# Patient Record
Sex: FEMALE | Race: WHITE | NOT HISPANIC OR LATINO | Employment: FULL TIME | URBAN - NONMETROPOLITAN AREA
[De-identification: names, ages, dates, MRNs, and addresses within clinical notes are randomized per-mention and may not be internally consistent; named-entity substitution may affect disease eponyms.]

---

## 2023-03-01 LAB
6-ACETYLMORPHINE: <25 NG/ML
7-AMINOCLONAZEPAM: <25 NG/ML
ALPHA-HYDROXYALPRAZOLAM: <25 NG/ML
ALPHA-HYDROXYMIDAZOLAM: <25 NG/ML
ALPRAZOLAM: <25 NG/ML
AMPHETAMINE (PRESENCE) IN URINE BY SCREEN METHOD: ABNORMAL
BARBITURATES PRESENCE IN URINE BY SCREEN METHOD: ABNORMAL
CANNABINOIDS IN URINE BY SCREEN METHOD: ABNORMAL
CHLORDIAZEPOXIDE: <25 NG/ML
CLONAZEPAM: <25 NG/ML
COCAINE (PRESENCE) IN URINE BY SCREEN METHOD: ABNORMAL
CODEINE: <50 NG/ML
CREATINE, URINE FOR DRUG: 115.8 MG/DL
DIAZEPAM: <25 NG/ML
DRUG SCREEN COMMENT URINE: ABNORMAL
EDDP: <25 NG/ML
FENTANYL CONFIRMATION, URINE: <2.5 NG/ML
HYDROCODONE: <25 NG/ML
HYDROMORPHONE: <25 NG/ML
LORAZEPAM: <25 NG/ML
METHADONE CONFIRMATION,URINE: <25 NG/ML
MIDAZOLAM: <25 NG/ML
MORPHINE URINE: <50 NG/ML
NORDIAZEPAM: <25 NG/ML
NORFENTANYL: <2.5 NG/ML
NORHYDROCODONE: <25 NG/ML
NOROXYCODONE: <25 NG/ML
O-DESMETHYLTRAMADOL: <50 NG/ML
OXAZEPAM: <25 NG/ML
OXYCODONE: <25 NG/ML
OXYMORPHONE: <25 NG/ML
PHENCYCLIDINE (PRESENCE) IN URINE BY SCREEN METHOD: ABNORMAL
TEMAZEPAM: <25 NG/ML
TRAMADOL: <50 NG/ML
ZOLPIDEM METABOLITE (ZCA): <25 NG/ML
ZOLPIDEM: <25 NG/ML

## 2023-03-04 LAB
AMPHETAMINES,URINE: >5000 NG/ML
MDA,URINE: <200 NG/ML
MDEA,URINE: <200 NG/ML
MDMA,UR: <200 NG/ML
METHAMPHETAMINE QUANTITATIVE URINE: <200 NG/ML
PHENTERMINE,UR: <200 NG/ML

## 2023-03-30 DIAGNOSIS — F90.8 ATTENTION DEFICIT HYPERACTIVITY DISORDER (ADHD), OTHER TYPE: ICD-10-CM

## 2023-03-30 RX ORDER — NEBIVOLOL 2.5 MG/1
1 TABLET ORAL DAILY
COMMUNITY
Start: 2023-01-18 | End: 2023-05-08 | Stop reason: ALTCHOICE

## 2023-03-30 RX ORDER — FUROSEMIDE 80 MG/1
80 TABLET ORAL DAILY
COMMUNITY

## 2023-03-30 RX ORDER — LISDEXAMFETAMINE DIMESYLATE 70 MG/1
70 CAPSULE ORAL EVERY MORNING
Qty: 30 CAPSULE | Refills: 0 | Status: SHIPPED | OUTPATIENT
Start: 2023-03-30 | End: 2023-05-08 | Stop reason: SDUPTHER

## 2023-03-30 RX ORDER — SITAGLIPTIN 100 MG/1
100 TABLET, FILM COATED ORAL DAILY
COMMUNITY
End: 2023-05-08 | Stop reason: SDUPTHER

## 2023-03-30 RX ORDER — LISDEXAMFETAMINE DIMESYLATE 70 MG/1
70 CAPSULE ORAL EVERY MORNING
COMMUNITY
End: 2023-03-30 | Stop reason: SDUPTHER

## 2023-04-03 RX ORDER — ERGOCALCIFEROL 1.25 MG/1
1 CAPSULE ORAL
COMMUNITY
Start: 2023-01-14

## 2023-04-04 RX ORDER — ERGOCALCIFEROL 1.25 MG/1
1 CAPSULE ORAL
Qty: 12 CAPSULE | Refills: 0 | OUTPATIENT
Start: 2023-04-04

## 2023-04-06 RX ORDER — ERGOCALCIFEROL 1.25 MG/1
1 CAPSULE ORAL
Qty: 12 CAPSULE | Refills: 0 | OUTPATIENT
Start: 2023-04-06

## 2023-05-03 DIAGNOSIS — F90.8 ATTENTION DEFICIT HYPERACTIVITY DISORDER (ADHD), OTHER TYPE: ICD-10-CM

## 2023-05-03 RX ORDER — LISDEXAMFETAMINE DIMESYLATE 70 MG/1
70 CAPSULE ORAL EVERY MORNING
Qty: 30 CAPSULE | Refills: 0 | OUTPATIENT
Start: 2023-05-03

## 2023-05-08 ENCOUNTER — OFFICE VISIT (OUTPATIENT)
Dept: PRIMARY CARE | Facility: CLINIC | Age: 48
End: 2023-05-08
Payer: COMMERCIAL

## 2023-05-08 VITALS
BODY MASS INDEX: 38.65 KG/M2 | HEIGHT: 65 IN | OXYGEN SATURATION: 98 % | DIASTOLIC BLOOD PRESSURE: 79 MMHG | WEIGHT: 232 LBS | SYSTOLIC BLOOD PRESSURE: 146 MMHG | HEART RATE: 122 BPM

## 2023-05-08 DIAGNOSIS — Z13.9 SCREENING FOR CONDITION: ICD-10-CM

## 2023-05-08 DIAGNOSIS — R00.0 ELEVATED PULSE RATE: ICD-10-CM

## 2023-05-08 DIAGNOSIS — F90.2 ADHD (ATTENTION DEFICIT HYPERACTIVITY DISORDER), COMBINED TYPE: Primary | ICD-10-CM

## 2023-05-08 DIAGNOSIS — R53.83 OTHER FATIGUE: ICD-10-CM

## 2023-05-08 DIAGNOSIS — Z12.31 ENCOUNTER FOR SCREENING MAMMOGRAM FOR MALIGNANT NEOPLASM OF BREAST: ICD-10-CM

## 2023-05-08 DIAGNOSIS — E78.5 DYSLIPIDEMIA: ICD-10-CM

## 2023-05-08 DIAGNOSIS — F90.8 ATTENTION DEFICIT HYPERACTIVITY DISORDER (ADHD), OTHER TYPE: ICD-10-CM

## 2023-05-08 DIAGNOSIS — R22.1 NECK MASS: ICD-10-CM

## 2023-05-08 DIAGNOSIS — E55.9 VITAMIN D DEFICIENCY: ICD-10-CM

## 2023-05-08 DIAGNOSIS — G99.2 STENOSIS OF CERVICAL SPINE WITH MYELOPATHY (MULTI): ICD-10-CM

## 2023-05-08 DIAGNOSIS — R60.0 LOCALIZED EDEMA: ICD-10-CM

## 2023-05-08 DIAGNOSIS — E66.01 CLASS 2 SEVERE OBESITY DUE TO EXCESS CALORIES WITH SERIOUS COMORBIDITY AND BODY MASS INDEX (BMI) OF 38.0 TO 38.9 IN ADULT (MULTI): ICD-10-CM

## 2023-05-08 DIAGNOSIS — Z12.11 COLON CANCER SCREENING: ICD-10-CM

## 2023-05-08 DIAGNOSIS — M48.02 STENOSIS OF CERVICAL SPINE WITH MYELOPATHY (MULTI): ICD-10-CM

## 2023-05-08 DIAGNOSIS — E28.2 PCOS (POLYCYSTIC OVARIAN SYNDROME): ICD-10-CM

## 2023-05-08 DIAGNOSIS — R03.0 ELEVATED BP WITHOUT DIAGNOSIS OF HYPERTENSION: ICD-10-CM

## 2023-05-08 PROBLEM — M47.812 CERVICAL SPONDYLOARTHRITIS: Status: ACTIVE | Noted: 2023-05-08

## 2023-05-08 PROBLEM — K21.9 GERD (GASTROESOPHAGEAL REFLUX DISEASE): Status: ACTIVE | Noted: 2023-05-08

## 2023-05-08 PROBLEM — R79.89 LOW SERUM CORTISOL LEVEL: Status: ACTIVE | Noted: 2023-05-08

## 2023-05-08 PROBLEM — K90.0 CELIAC DISEASE (HHS-HCC): Status: ACTIVE | Noted: 2020-10-19

## 2023-05-08 PROBLEM — J04.0 ACUTE LARYNGITIS: Status: ACTIVE | Noted: 2018-12-06

## 2023-05-08 PROBLEM — M50.10 HERNIATION OF CERVICAL INTERVERTEBRAL DISC WITH RADICULOPATHY: Status: ACTIVE | Noted: 2023-05-08

## 2023-05-08 PROBLEM — E04.9 ENLARGED THYROID: Status: ACTIVE | Noted: 2020-10-19

## 2023-05-08 PROBLEM — H66.90 ACUTE OTITIS MEDIA: Status: ACTIVE | Noted: 2017-04-05

## 2023-05-08 PROBLEM — J01.01 ACUTE RECURRENT MAXILLARY SINUSITIS: Status: ACTIVE | Noted: 2023-05-08

## 2023-05-08 PROBLEM — M48.061 LUMBAR STENOSIS: Status: ACTIVE | Noted: 2023-05-08

## 2023-05-08 PROBLEM — M47.817 LUMBAR AND SACRAL SPONDYLOARTHRITIS: Status: ACTIVE | Noted: 2023-05-08

## 2023-05-08 PROBLEM — J30.9 ALLERGIC RHINITIS DUE TO ALLERGEN: Status: ACTIVE | Noted: 2023-05-08

## 2023-05-08 PROBLEM — M50.20 HERNIATED CERVICAL DISC WITHOUT MYELOPATHY: Status: ACTIVE | Noted: 2023-05-08

## 2023-05-08 PROBLEM — N92.6 IRREGULAR MENSES: Status: ACTIVE | Noted: 2020-10-19

## 2023-05-08 PROBLEM — M54.12 RIGHT CERVICAL RADICULOPATHY: Status: ACTIVE | Noted: 2023-05-08

## 2023-05-08 PROBLEM — R59.1 LYMPHADENOPATHY: Status: ACTIVE | Noted: 2017-04-05

## 2023-05-08 PROBLEM — G47.00 INSOMNIA: Status: ACTIVE | Noted: 2023-05-08

## 2023-05-08 PROBLEM — J30.89 CHRONIC NONSEASONAL ALLERGIC RHINITIS DUE TO POLLEN: Status: ACTIVE | Noted: 2017-10-20

## 2023-05-08 PROBLEM — J20.8 ACUTE BRONCHITIS DUE TO OTHER SPECIFIED ORGANISMS: Status: ACTIVE | Noted: 2023-05-08

## 2023-05-08 PROBLEM — M54.50 LOW BACK PAIN: Status: ACTIVE | Noted: 2023-05-08

## 2023-05-08 PROCEDURE — 99214 OFFICE O/P EST MOD 30 MIN: CPT | Performed by: FAMILY MEDICINE

## 2023-05-08 PROCEDURE — 1036F TOBACCO NON-USER: CPT | Performed by: FAMILY MEDICINE

## 2023-05-08 PROCEDURE — 3008F BODY MASS INDEX DOCD: CPT | Performed by: FAMILY MEDICINE

## 2023-05-08 RX ORDER — LISDEXAMFETAMINE DIMESYLATE 70 MG/1
70 CAPSULE ORAL EVERY MORNING
Qty: 30 CAPSULE | Refills: 0 | Status: SHIPPED | OUTPATIENT
Start: 2023-05-08 | End: 2023-06-06 | Stop reason: SDUPTHER

## 2023-05-08 RX ORDER — METOPROLOL TARTRATE 25 MG/1
TABLET, FILM COATED ORAL
Qty: 30 TABLET | Refills: 1 | Status: SHIPPED | OUTPATIENT
Start: 2023-05-08

## 2023-05-08 ASSESSMENT — PAIN SCALES - GENERAL: PAINLEVEL: 0-NO PAIN

## 2023-05-08 NOTE — PROGRESS NOTES
Subjective     Fatou Schilling is a 47 y.o. female who presents for Follow-up (3 month follow up meds).      HPI  The patient is a 47 year-old female presenting to the clinic for a 3 month follow up on medication.  Discussed elevated blood pressure.  Complete blood work ordered 5/8/2023.  I have educated the patient on fatigue. The patient denies restless leg syndrome and denies obstructive sleep apnea.  Discussed Edema.  Referral placed with Vascular Medicine 5/8/2023.  I have reviewed and validated OARRS. I advised the patient about medication abuse, addiction, and dependency. I have counseled the patient on ADHD management. Caution advised about the medication’s effect on pregnancy. Does not wish to go for urine pregnancy test.  Educated the patient on obesity, low-fat diet and exercise. Encouraged the patient to lose weight.   Order placed for Mammogram 5/8/2023.  Order placed for Colonoscopy 5/8/2023.  Complete blood work after fasting for 10 hours.  Follow up in office.        History of ADHD.  Medication is helping with the concentration focus.  Denies depression.  Denies suicidal.  Denies homicidal.  Had been to cardiologist and discussed about elevated pulse.  Educated on edema.  Denies PND but denies orthopnea.  Educated on obesity and diet and exercise but advised to lose weight.  Discussed screening blood work for screening for condition.  History of cervical spine stenosis.  Advised to keep appointment with the specialist.  Complaining feeling tired but advised on diet exercise.  Complaint lump on the neck and concerned about thyroid.  Educated on elevated blood pressure and low-salt diet exercise.  Keep appointment with the cardiologist.  Educated on elevated pulse rate and keep appoint with a cardiologist.  Denies chest pain.  Denies heart palpitations.  Denies shortness of breath.  Advised to call 911 if develops chest pain and/or heart palpitations and/or shortness of breath.  Educated on vitamin  "deficiency.  Educated on PCOS.          Review of Systems  Review of systems:    General:  Denies fever.  Denies chills.    HEENT: Denies nasal congestion.  Denies sinus pressure.    Respiratory:  Denies cough.  Denies shortness of breath.    Cardiovascular:  Dictated as above.    Gastrointestinal:  Denies nausea vomiting diarrhea.  Denies abdominal pain.    Genitourinary: Denies burning urination.  Denies frequent urination.  Denies flank pain.  Denies blood in the urine.  Denies abnormal vaginal discharge.    Neurology:  Dictated as above.      Musculoskeletal:  Denies body aches.  Denies joint pains.  Denies muscle aches.  Denies muscle weakness    Endocrinology:  Dictated as above.    Psychiatric:  Dictated as above.    Skin exam.  Dictated as above  Objective   /79 (BP Location: Right arm, Patient Position: Sitting, BP Cuff Size: Large adult)   Pulse (!) 122   Ht 1.651 m (5' 5\")   Wt 105 kg (232 lb)   SpO2 98%   BMI 38.61 kg/m²        Physical Exam  General.  Not in distress.  HEENT normocephalic anicteric sclerae.  Neck soft supple no thyromegaly.     Lungs are clear.  Heart regular.  Abdomen soft nontender nondistended bowel sounds are positive.  Extremities no clubbing cyanosis or edema.  Psychiatric.  Has good eye contact.  No crying spells noted.  Speech was normal.  Denies depression.  Denies suicidal.  Denies homicidal.  Skin exam.  Lump noted on the anterior neck.    Assessment/Plan   ADHD.  Counseled for ADHD.  Reviewed OARRS report and validated.  Caution advised about medication for abuse, addiction and dependency.  Does not wish to go see the counselor for counseling.  Does not wish to go see the psychiatrist.  I have reviewed urine drug screening.  Reviewed controlled medication contract.  2.  Edema.  Denies PND.  Denies orthopnea  3.  Obesity.  Educated on diet exercise but advised to lose weight.    4.  Screening for condition.  Dictated as above.    5.  Cervical spine stenosis.  " Dictated as above.    6.  Elevated pulse rate/elevated BP.  Dictated as above.    7.  Neck mass.  We will follow-up on the ultrasound.    8.  Fatigue.  Educated on diet and exercise.    9.  Dyslipidemia.  Educated on diet exercise we will continue monitor.    10.  Vitamin D deficiency.  Educated on vitamin D deficiency we will continue monitor.                Problem List Items Addressed This Visit          Nervous    Stenosis of cervical spine with myelopathy (CMS/HCC)       Circulatory    Elevated BP without diagnosis of hypertension    Relevant Orders    Albumin , Urine Random       Other    ADD (attention deficit disorder)    Relevant Medications    lisdexamfetamine (Vyvanse) 70 mg capsule    ADHD (attention deficit hyperactivity disorder), combined type - Primary    Dyslipidemia    Relevant Orders    Lipid panel    Edema    Relevant Orders    Referral to Vascular Medicine    Fatigue    Relevant Orders    CBC and Auto Differential    Comprehensive metabolic panel    Urinalysis with Reflex Microscopic    Tsh With Reflex To Free T4 If Abnormal    Vitamin B12    Folate     Other Visit Diagnoses       Class 2 severe obesity due to excess calories with serious comorbidity and body mass index (BMI) of 38.0 to 38.9 in adult (CMS/HCC)        Encounter for screening mammogram for malignant neoplasm of breast        Relevant Orders    BI mammo bilateral screening tomosynthesis    Screening for condition        Relevant Orders    HIV 1/2 antibodies, rapid    Hepatitis C antibody    Colon cancer screening        Relevant Orders    Colonoscopy    Elevated pulse rate        Relevant Medications    metoprolol tartrate (Lopressor) 25 mg tablet    Neck mass        Relevant Orders    US head neck soft tissue    PCOS (polycystic ovarian syndrome)        Relevant Medications    SITagliptin phosphate (Januvia) 100 mg tablet    Other Relevant Orders    Hemoglobin A1C    Insulin, random    Vitamin D deficiency        Relevant Orders     Vitamin D 25-Hydroxy,Total            Scribe Attestation  By signing my name below, I, Aquiles Snow   attest that this documentation has been prepared under the direction and in the presence of Lele Robb MD.

## 2023-06-06 DIAGNOSIS — F90.8 ATTENTION DEFICIT HYPERACTIVITY DISORDER (ADHD), OTHER TYPE: ICD-10-CM

## 2023-06-06 RX ORDER — LISDEXAMFETAMINE DIMESYLATE 70 MG/1
70 CAPSULE ORAL EVERY MORNING
Qty: 30 CAPSULE | Refills: 0 | Status: SHIPPED | OUTPATIENT
Start: 2023-06-06 | End: 2023-07-28 | Stop reason: SDUPTHER

## 2023-07-28 ENCOUNTER — OFFICE VISIT (OUTPATIENT)
Dept: PRIMARY CARE | Facility: CLINIC | Age: 48
End: 2023-07-28
Payer: COMMERCIAL

## 2023-07-28 VITALS
DIASTOLIC BLOOD PRESSURE: 98 MMHG | HEART RATE: 103 BPM | BODY MASS INDEX: 41.15 KG/M2 | HEIGHT: 64 IN | SYSTOLIC BLOOD PRESSURE: 140 MMHG | TEMPERATURE: 96.2 F | WEIGHT: 241 LBS | OXYGEN SATURATION: 97 %

## 2023-07-28 DIAGNOSIS — I89.0 LYMPHEDEMA: Primary | ICD-10-CM

## 2023-07-28 DIAGNOSIS — Z79.899 MEDICATION MANAGEMENT: ICD-10-CM

## 2023-07-28 DIAGNOSIS — F90.8 ATTENTION DEFICIT HYPERACTIVITY DISORDER (ADHD), OTHER TYPE: ICD-10-CM

## 2023-07-28 DIAGNOSIS — E28.2 PCOS (POLYCYSTIC OVARIAN SYNDROME): ICD-10-CM

## 2023-07-28 DIAGNOSIS — R35.0 FREQUENCY OF URINATION: ICD-10-CM

## 2023-07-28 LAB
POC APPEARANCE, URINE: ABNORMAL
POC BILIRUBIN, URINE: NEGATIVE
POC BLOOD, URINE: NEGATIVE
POC COLOR, URINE: YELLOW
POC GLUCOSE, URINE: NEGATIVE MG/DL
POC KETONES, URINE: NEGATIVE MG/DL
POC LEUKOCYTES, URINE: NEGATIVE
POC NITRITE,URINE: NEGATIVE
POC PH, URINE: 6 PH
POC PROTEIN, URINE: ABNORMAL MG/DL
POC SPECIFIC GRAVITY, URINE: >=1.03
POC UROBILINOGEN, URINE: 0.2 EU/DL

## 2023-07-28 PROCEDURE — 87205 SMEAR GRAM STAIN: CPT

## 2023-07-28 PROCEDURE — 99203 OFFICE O/P NEW LOW 30 MIN: CPT

## 2023-07-28 PROCEDURE — 1036F TOBACCO NON-USER: CPT

## 2023-07-28 PROCEDURE — 80324 DRUG SCREEN AMPHETAMINES 1/2: CPT

## 2023-07-28 PROCEDURE — 3008F BODY MASS INDEX DOCD: CPT

## 2023-07-28 PROCEDURE — 81003 URINALYSIS AUTO W/O SCOPE: CPT

## 2023-07-28 PROCEDURE — 80307 DRUG TEST PRSMV CHEM ANLYZR: CPT

## 2023-07-28 PROCEDURE — 87086 URINE CULTURE/COLONY COUNT: CPT

## 2023-07-28 RX ORDER — LISDEXAMFETAMINE DIMESYLATE 70 MG/1
70 CAPSULE ORAL EVERY MORNING
Qty: 30 CAPSULE | Refills: 0 | Status: SHIPPED | OUTPATIENT
Start: 2023-07-28 | End: 2023-07-31 | Stop reason: SDUPTHER

## 2023-07-28 ASSESSMENT — PATIENT HEALTH QUESTIONNAIRE - PHQ9
SUM OF ALL RESPONSES TO PHQ9 QUESTIONS 1 AND 2: 0
2. FEELING DOWN, DEPRESSED OR HOPELESS: NOT AT ALL
1. LITTLE INTEREST OR PLEASURE IN DOING THINGS: NOT AT ALL

## 2023-07-28 ASSESSMENT — ENCOUNTER SYMPTOMS
LOSS OF SENSATION IN FEET: 0
DEPRESSION: 0
OCCASIONAL FEELINGS OF UNSTEADINESS: 0

## 2023-07-28 NOTE — PATIENT INSTRUCTIONS
Labs next week in [facility] panel and UA, UDS today  See in 90 days    Thank you for coming in today, if any questions or concerns arise, please call my office.   Mikael Davies, RAHAT-CNP

## 2023-07-28 NOTE — PROGRESS NOTES
Subjective   Patient ID: Fatou Schilling is a 47 y.o. female who presents for Establish Care (Fatou is here to establish care. Has been out of medications since the 10th. However she did find a tablet in her purse and was able to take that. ).  Patient presents to establish care today, UDS and contract to sign today.   Seeing Endocrine for Thyroid Goiter, we will obtain TSH today    Chronic back pain  PCOS - Managed with Januvia.             Vitals:    07/28/23 1007   BP: (!) 140/98   Pulse: 103   Temp: 35.7 °C (96.2 °F)   SpO2: 97%       Review of Systems    Objective   Physical Exam    Assessment/Plan   Problem List Items Addressed This Visit       ADD (attention deficit disorder)    Relevant Medications    lisdexamfetamine (Vyvanse) 70 mg capsule     Other Visit Diagnoses       Lymphedema    -  Primary    PCOS (polycystic ovarian syndrome)        Relevant Medications    SITagliptin phosphate (Januvia) 100 mg tablet    lisdexamfetamine (Vyvanse) 70 mg capsule    Other Relevant Orders    Vitamin D 1,25 Dihydroxy    Vitamin B12    Folate    TSH with reflex to Free T4 if abnormal    Hemoglobin A1C    Comprehensive Metabolic Panel    CBC    Insulin, random    Testosterone, total and free    Cortisol    Frequency of urination        Relevant Orders    POCT UA Automated manually resulted (Completed)    Gram Stain for Bacterial Vaginosis/Yeast    Urine Culture    Medication management        Relevant Orders    Drug Screen, Urine With Reflex to Confirmation                 Thank you for coming in today, please call my office if you have any concerns or questions.     Mikael GIANG, CNP

## 2023-07-29 LAB
AMPHETAMINE (PRESENCE) IN URINE BY SCREEN METHOD: ABNORMAL
BARBITURATES PRESENCE IN URINE BY SCREEN METHOD: ABNORMAL
BENZODIAZEPINE (PRESENCE) IN URINE BY SCREEN METHOD: ABNORMAL
CANNABINOIDS IN URINE BY SCREEN METHOD: ABNORMAL
CLUE CELLS: PRESENT
COCAINE (PRESENCE) IN URINE BY SCREEN METHOD: ABNORMAL
DRUG SCREEN COMMENT URINE: ABNORMAL
FENTANYL URINE: ABNORMAL
METHADONE (PRESENCE) IN URINE BY SCREEN METHOD: ABNORMAL
NUGENT SCORE: 8
OPIATES (PRESENCE) IN URINE BY SCREEN METHOD: ABNORMAL
OXYCODONE (PRESENCE) IN URINE BY SCREEN METHOD: ABNORMAL
PHENCYCLIDINE (PRESENCE) IN URINE BY SCREEN METHOD: ABNORMAL
YEAST: ABNORMAL

## 2023-07-30 LAB — URINE CULTURE: NORMAL

## 2023-07-31 DIAGNOSIS — F90.8 ATTENTION DEFICIT HYPERACTIVITY DISORDER (ADHD), OTHER TYPE: ICD-10-CM

## 2023-07-31 DIAGNOSIS — N76.0 BACTERIAL VAGINOSIS: Primary | ICD-10-CM

## 2023-07-31 DIAGNOSIS — E28.2 PCOS (POLYCYSTIC OVARIAN SYNDROME): ICD-10-CM

## 2023-07-31 DIAGNOSIS — B96.89 BACTERIAL VAGINOSIS: Primary | ICD-10-CM

## 2023-07-31 RX ORDER — METRONIDAZOLE 500 MG/1
500 TABLET ORAL 2 TIMES DAILY
Qty: 14 TABLET | Refills: 0 | Status: SHIPPED | OUTPATIENT
Start: 2023-07-31 | End: 2023-08-07

## 2023-07-31 RX ORDER — LISDEXAMFETAMINE DIMESYLATE 70 MG/1
70 CAPSULE ORAL EVERY MORNING
Qty: 30 CAPSULE | Refills: 0 | Status: SHIPPED | OUTPATIENT
Start: 2023-07-31 | End: 2023-09-05 | Stop reason: SDUPTHER

## 2023-07-31 NOTE — TELEPHONE ENCOUNTER
----- Message from RAHAT Liang-CNP sent at 7/31/2023  8:05 AM EDT -----  Positive for BV, start Metronidazole BID for 7 Days. Please notify. Sent to [facility]

## 2023-07-31 NOTE — TELEPHONE ENCOUNTER
Spoke with Fatou. Verbalized understanding. She is needing the vyvanse sent to [facility] on [facility]. All other pharmacies are out of that dose.

## 2023-08-07 LAB
AMPHETAMINES,URINE: <50 NG/ML
MDA,URINE: <200 NG/ML
MDEA,URINE: <200 NG/ML
MDMA,UR: <200 NG/ML
METHAMPHETAMINE QUANTITATIVE URINE: <200 NG/ML
PHENTERMINE,UR: <200 NG/ML

## 2023-08-08 ENCOUNTER — APPOINTMENT (OUTPATIENT)
Dept: PRIMARY CARE | Facility: CLINIC | Age: 48
End: 2023-08-08
Payer: COMMERCIAL

## 2023-09-05 ENCOUNTER — TELEPHONE (OUTPATIENT)
Dept: PRIMARY CARE | Facility: CLINIC | Age: 48
End: 2023-09-05
Payer: COMMERCIAL

## 2023-09-05 DIAGNOSIS — E28.2 PCOS (POLYCYSTIC OVARIAN SYNDROME): ICD-10-CM

## 2023-09-05 DIAGNOSIS — F90.8 ATTENTION DEFICIT HYPERACTIVITY DISORDER (ADHD), OTHER TYPE: ICD-10-CM

## 2023-09-05 RX ORDER — LISDEXAMFETAMINE DIMESYLATE 60 MG/1
60 CAPSULE ORAL EVERY MORNING
Qty: 30 CAPSULE | Refills: 0 | Status: SHIPPED | OUTPATIENT
Start: 2023-09-05 | End: 2023-10-05 | Stop reason: SDUPTHER

## 2023-09-05 NOTE — TELEPHONE ENCOUNTER
Fatou called stating that the Vyvanse 70mg is currently out of stock. Is willing to try the 60mg.

## 2023-10-05 DIAGNOSIS — E28.2 PCOS (POLYCYSTIC OVARIAN SYNDROME): ICD-10-CM

## 2023-10-05 DIAGNOSIS — F90.8 ATTENTION DEFICIT HYPERACTIVITY DISORDER (ADHD), OTHER TYPE: ICD-10-CM

## 2023-10-06 ENCOUNTER — TELEPHONE (OUTPATIENT)
Dept: PRIMARY CARE | Facility: CLINIC | Age: 48
End: 2023-10-06
Payer: COMMERCIAL

## 2023-10-06 DIAGNOSIS — E28.2 PCOS (POLYCYSTIC OVARIAN SYNDROME): ICD-10-CM

## 2023-10-06 DIAGNOSIS — F90.8 ATTENTION DEFICIT HYPERACTIVITY DISORDER (ADHD), OTHER TYPE: ICD-10-CM

## 2023-10-06 RX ORDER — LISDEXAMFETAMINE DIMESYLATE 60 MG/1
60 CAPSULE ORAL EVERY MORNING
Qty: 30 CAPSULE | Refills: 0 | Status: SHIPPED | OUTPATIENT
Start: 2023-10-06 | End: 2023-10-06 | Stop reason: SDUPTHER

## 2023-10-06 RX ORDER — LISDEXAMFETAMINE DIMESYLATE 70 MG/1
70 CAPSULE ORAL EVERY MORNING
Qty: 30 CAPSULE | Refills: 0 | Status: SHIPPED | OUTPATIENT
Start: 2023-10-06 | End: 2023-10-09 | Stop reason: SDUPTHER

## 2023-10-06 NOTE — TELEPHONE ENCOUNTER
Fatou called stating that [facility] in [place] has the 70mg vyvanse. [facility] does not have any available.

## 2023-10-09 DIAGNOSIS — F90.8 ATTENTION DEFICIT HYPERACTIVITY DISORDER (ADHD), OTHER TYPE: ICD-10-CM

## 2023-10-09 DIAGNOSIS — E28.2 PCOS (POLYCYSTIC OVARIAN SYNDROME): ICD-10-CM

## 2023-10-09 RX ORDER — LISDEXAMFETAMINE DIMESYLATE 70 MG/1
70 CAPSULE ORAL EVERY MORNING
Qty: 30 CAPSULE | Refills: 0 | Status: SHIPPED | OUTPATIENT
Start: 2023-10-09 | End: 2023-10-31 | Stop reason: SDUPTHER

## 2023-10-09 NOTE — TELEPHONE ENCOUNTER
Patient called and left a  Sunday stating that [facility] does not have her vyvanse but now [facility] does.     Asking this be sent to [facility]

## 2023-10-27 ENCOUNTER — APPOINTMENT (OUTPATIENT)
Dept: PRIMARY CARE | Facility: CLINIC | Age: 48
End: 2023-10-27
Payer: COMMERCIAL

## 2023-10-30 ENCOUNTER — LAB (OUTPATIENT)
Dept: LAB | Facility: LAB | Age: 48
End: 2023-10-30
Payer: COMMERCIAL

## 2023-10-30 DIAGNOSIS — E28.2 PCOS (POLYCYSTIC OVARIAN SYNDROME): ICD-10-CM

## 2023-10-30 LAB
ALBUMIN SERPL BCP-MCNC: 4.3 G/DL (ref 3.4–5)
ALP SERPL-CCNC: 66 U/L (ref 33–110)
ALT SERPL W P-5'-P-CCNC: 17 U/L (ref 7–45)
ANION GAP SERPL CALC-SCNC: 12 MMOL/L (ref 10–20)
AST SERPL W P-5'-P-CCNC: 14 U/L (ref 9–39)
BILIRUB SERPL-MCNC: 0.3 MG/DL (ref 0–1.2)
BUN SERPL-MCNC: 15 MG/DL (ref 6–23)
CALCIUM SERPL-MCNC: 9.3 MG/DL (ref 8.6–10.3)
CHLORIDE SERPL-SCNC: 106 MMOL/L (ref 98–107)
CO2 SERPL-SCNC: 24 MMOL/L (ref 21–32)
CREAT SERPL-MCNC: 0.77 MG/DL (ref 0.5–1.05)
ERYTHROCYTE [DISTWIDTH] IN BLOOD BY AUTOMATED COUNT: 12 % (ref 11.5–14.5)
GFR SERPL CREATININE-BSD FRML MDRD: >90 ML/MIN/1.73M*2
GLUCOSE SERPL-MCNC: 97 MG/DL (ref 74–99)
HCT VFR BLD AUTO: 45.3 % (ref 36–46)
HGB BLD-MCNC: 14.5 G/DL (ref 12–16)
MCH RBC QN AUTO: 29.8 PG (ref 26–34)
MCHC RBC AUTO-ENTMCNC: 32 G/DL (ref 32–36)
MCV RBC AUTO: 93 FL (ref 80–100)
NRBC BLD-RTO: 0 /100 WBCS (ref 0–0)
PLATELET # BLD AUTO: 234 X10*3/UL (ref 150–450)
PMV BLD AUTO: 11.3 FL (ref 7.5–11.5)
POTASSIUM SERPL-SCNC: 4 MMOL/L (ref 3.5–5.3)
PROT SERPL-MCNC: 6.8 G/DL (ref 6.4–8.2)
RBC # BLD AUTO: 4.86 X10*6/UL (ref 4–5.2)
SODIUM SERPL-SCNC: 138 MMOL/L (ref 136–145)
TSH SERPL-ACNC: 3.7 MIU/L (ref 0.44–3.98)
WBC # BLD AUTO: 9.8 X10*3/UL (ref 4.4–11.3)

## 2023-10-30 PROCEDURE — 83036 HEMOGLOBIN GLYCOSYLATED A1C: CPT

## 2023-10-30 PROCEDURE — 82652 VIT D 1 25-DIHYDROXY: CPT

## 2023-10-30 PROCEDURE — 84402 ASSAY OF FREE TESTOSTERONE: CPT

## 2023-10-30 PROCEDURE — 82533 TOTAL CORTISOL: CPT

## 2023-10-30 PROCEDURE — 36415 COLL VENOUS BLD VENIPUNCTURE: CPT

## 2023-10-30 PROCEDURE — 80053 COMPREHEN METABOLIC PANEL: CPT

## 2023-10-30 PROCEDURE — 82746 ASSAY OF FOLIC ACID SERUM: CPT

## 2023-10-30 PROCEDURE — 84443 ASSAY THYROID STIM HORMONE: CPT

## 2023-10-30 PROCEDURE — 82607 VITAMIN B-12: CPT

## 2023-10-30 PROCEDURE — 85027 COMPLETE CBC AUTOMATED: CPT

## 2023-10-30 PROCEDURE — 83525 ASSAY OF INSULIN: CPT

## 2023-10-31 ENCOUNTER — OFFICE VISIT (OUTPATIENT)
Dept: PRIMARY CARE | Facility: CLINIC | Age: 48
End: 2023-10-31
Payer: COMMERCIAL

## 2023-10-31 VITALS
BODY MASS INDEX: 40.51 KG/M2 | WEIGHT: 236 LBS | OXYGEN SATURATION: 98 % | TEMPERATURE: 96.1 F | SYSTOLIC BLOOD PRESSURE: 140 MMHG | DIASTOLIC BLOOD PRESSURE: 90 MMHG | HEART RATE: 123 BPM

## 2023-10-31 DIAGNOSIS — F90.8 ATTENTION DEFICIT HYPERACTIVITY DISORDER (ADHD), OTHER TYPE: ICD-10-CM

## 2023-10-31 DIAGNOSIS — E28.2 PCOS (POLYCYSTIC OVARIAN SYNDROME): ICD-10-CM

## 2023-10-31 DIAGNOSIS — L68.0 HIRSUTISM: Primary | ICD-10-CM

## 2023-10-31 LAB
CORTIS SERPL-MCNC: 20.7 UG/DL (ref 2.5–20)
EST. AVERAGE GLUCOSE BLD GHB EST-MCNC: 114 MG/DL
FOLATE SERPL-MCNC: 8.6 NG/ML
HBA1C MFR BLD: 5.6 %
INSULIN SERPL-ACNC: 10 UIU/ML (ref 3–25)
VIT B12 SERPL-MCNC: 332 PG/ML (ref 211–911)

## 2023-10-31 PROCEDURE — 1036F TOBACCO NON-USER: CPT

## 2023-10-31 PROCEDURE — 3008F BODY MASS INDEX DOCD: CPT

## 2023-10-31 PROCEDURE — 99214 OFFICE O/P EST MOD 30 MIN: CPT

## 2023-10-31 RX ORDER — GABAPENTIN 300 MG/1
CAPSULE ORAL
COMMUNITY
Start: 2021-06-08

## 2023-10-31 RX ORDER — FAMOTIDINE 40 MG/1
1 TABLET, FILM COATED ORAL DAILY
COMMUNITY

## 2023-10-31 RX ORDER — FINASTERIDE 5 MG/1
1 TABLET, FILM COATED ORAL DAILY
COMMUNITY
Start: 2022-10-04

## 2023-10-31 RX ORDER — GABAPENTIN 600 MG/1
TABLET ORAL
COMMUNITY

## 2023-10-31 RX ORDER — CYCLOBENZAPRINE HCL 10 MG
TABLET ORAL 3 TIMES DAILY PRN
COMMUNITY
Start: 2020-09-01

## 2023-10-31 RX ORDER — EMOLLIENT COMBINATION NO.53
CREAM (GRAM) TOPICAL
COMMUNITY
Start: 2021-05-19

## 2023-10-31 RX ORDER — AMMONIUM LACTATE 12 G/100G
CREAM TOPICAL AS NEEDED
COMMUNITY
Start: 2021-05-19

## 2023-10-31 RX ORDER — LISDEXAMFETAMINE DIMESYLATE 70 MG/1
70 CAPSULE ORAL EVERY MORNING
Qty: 30 CAPSULE | Refills: 0 | Status: SHIPPED | OUTPATIENT
Start: 2023-10-31 | End: 2023-12-07

## 2023-10-31 NOTE — PROGRESS NOTES
Subjective   Patient ID: Fatou Schilling is a 47 y.o. female who presents for Follow-up (90 day controlled follow up for vyvanse/Discuss PCOS) and Results (Had labs completed).  Fatou presents along with her patient for controlled follow up and lab discussion    Stimulants:   What is the patient's goal of therapy? concentration  Is this being achieved with current treatment? yes    Activities of Daily Living:   Is your overall impression that this patient is benefiting (symptom reduction outweighs side effects) from stimulant therapy? Yes     1. Physical Functioning: Same  2. Family Relationship: Same  3. Social Relationship: Same  4. Mood: Same  5. Sleep Patterns: Same  6. Overall Function: Better    Does demonstrate cushing's presentation, buffalo hump deformity, facial hair  Truncal obesity  Cortisol is elevated, up from 2 years prior.   Did recommend she see new endocrinologist for further management, she has seen multiple specialists for this issue in the past.     PCOS - taking Januvia, could not tolerate Metformin, per a endocrine provider, this provides some benefit                Vitals:    10/31/23 1328   BP: 140/90   Pulse: (!) 123   Temp: 35.6 °C (96.1 °F)   SpO2: 98%       Review of Systems    Objective   Physical Exam    Assessment/Plan   Problem List Items Addressed This Visit       ADD (attention deficit disorder)    Relevant Medications    lisdexamfetamine (Vyvanse) 70 mg capsule     Other Visit Diagnoses       Hirsutism    -  Primary    Relevant Orders    Referral to Endocrinology    PCOS (polycystic ovarian syndrome)        Relevant Medications    lisdexamfetamine (Vyvanse) 70 mg capsule    Other Relevant Orders    Estrogens, Total                 Thank you for coming in today, please call my office if you have any concerns or questions.     Mikael GIANG, CNP

## 2023-11-02 LAB — 1,25(OH)2D3 SERPL-MCNC: 53.2 PG/ML (ref 19.9–79.3)

## 2023-11-04 LAB
TESTOSTERONE FREE (CHAN): 4.3 PG/ML (ref 0.1–6.4)
TESTOSTERONE,TOTAL,LC-MS/MS: 24 NG/DL (ref 2–45)

## 2023-11-06 ENCOUNTER — LAB (OUTPATIENT)
Dept: LAB | Facility: LAB | Age: 48
End: 2023-11-06
Payer: COMMERCIAL

## 2023-11-06 DIAGNOSIS — E28.2 PCOS (POLYCYSTIC OVARIAN SYNDROME): ICD-10-CM

## 2023-11-06 PROCEDURE — 36415 COLL VENOUS BLD VENIPUNCTURE: CPT

## 2023-11-06 PROCEDURE — 82672 ASSAY OF ESTROGEN: CPT

## 2023-11-10 LAB — ESTROGEN SERPL-MCNC: 191 PG/ML

## 2023-12-06 DIAGNOSIS — F90.8 ATTENTION DEFICIT HYPERACTIVITY DISORDER (ADHD), OTHER TYPE: ICD-10-CM

## 2023-12-06 DIAGNOSIS — E28.2 PCOS (POLYCYSTIC OVARIAN SYNDROME): ICD-10-CM

## 2023-12-06 RX ORDER — LISDEXAMFETAMINE DIMESYLATE 70 MG/1
70 CAPSULE ORAL EVERY MORNING
Qty: 30 CAPSULE | Refills: 0 | Status: CANCELLED | OUTPATIENT
Start: 2023-12-06 | End: 2024-01-05

## 2023-12-07 DIAGNOSIS — F90.8 ATTENTION DEFICIT HYPERACTIVITY DISORDER (ADHD), OTHER TYPE: ICD-10-CM

## 2023-12-07 DIAGNOSIS — E28.2 PCOS (POLYCYSTIC OVARIAN SYNDROME): ICD-10-CM

## 2023-12-07 RX ORDER — LISDEXAMFETAMINE DIMESYLATE 70 MG/1
70 CAPSULE ORAL
Qty: 30 CAPSULE | Refills: 0 | Status: SHIPPED | OUTPATIENT
Start: 2023-12-07 | End: 2024-01-04 | Stop reason: SDUPTHER

## 2024-01-04 DIAGNOSIS — E28.2 PCOS (POLYCYSTIC OVARIAN SYNDROME): ICD-10-CM

## 2024-01-04 DIAGNOSIS — F90.8 ATTENTION DEFICIT HYPERACTIVITY DISORDER (ADHD), OTHER TYPE: ICD-10-CM

## 2024-01-04 RX ORDER — LISDEXAMFETAMINE DIMESYLATE 70 MG/1
70 CAPSULE ORAL
Qty: 30 CAPSULE | Refills: 0 | Status: SHIPPED | OUTPATIENT
Start: 2024-01-04 | End: 2024-01-26 | Stop reason: SDUPTHER

## 2024-01-26 ENCOUNTER — OFFICE VISIT (OUTPATIENT)
Dept: PRIMARY CARE | Facility: CLINIC | Age: 49
End: 2024-01-26
Payer: COMMERCIAL

## 2024-01-26 VITALS
DIASTOLIC BLOOD PRESSURE: 80 MMHG | SYSTOLIC BLOOD PRESSURE: 140 MMHG | HEART RATE: 81 BPM | OXYGEN SATURATION: 93 % | TEMPERATURE: 97.6 F

## 2024-01-26 DIAGNOSIS — E28.2 PCOS (POLYCYSTIC OVARIAN SYNDROME): ICD-10-CM

## 2024-01-26 DIAGNOSIS — F90.2 ADHD (ATTENTION DEFICIT HYPERACTIVITY DISORDER), COMBINED TYPE: Primary | ICD-10-CM

## 2024-01-26 DIAGNOSIS — F90.8 ATTENTION DEFICIT HYPERACTIVITY DISORDER (ADHD), OTHER TYPE: ICD-10-CM

## 2024-01-26 PROCEDURE — 1036F TOBACCO NON-USER: CPT

## 2024-01-26 PROCEDURE — 99214 OFFICE O/P EST MOD 30 MIN: CPT

## 2024-01-26 PROCEDURE — 3008F BODY MASS INDEX DOCD: CPT

## 2024-01-26 RX ORDER — LISDEXAMFETAMINE DIMESYLATE 70 MG/1
70 CAPSULE ORAL
Qty: 30 CAPSULE | Refills: 0 | Status: SHIPPED | OUTPATIENT
Start: 2024-01-26 | End: 2024-03-01 | Stop reason: SDUPTHER

## 2024-01-26 NOTE — PROGRESS NOTES
Subjective   Patient ID: Fatou Schilling is a 48 y.o. female who presents for Follow-up (Fatou is here for her controlled visit. ).  Stimulants:   What is the patient's goal of therapy? ADHD treatment, Vyvanse  Is this being achieved with current treatment? yes    Activities of Daily Living:   Is your overall impression that this patient is benefiting (symptom reduction outweighs side effects) from stimulant therapy? Yes     1. Physical Functioning: Better  2. Family Relationship: Better  3. Social Relationship: Better  4. Mood: Same  5. Sleep Patterns: Better  6. Overall Function: Better                Vitals:    01/26/24 1408   BP: 140/80   Pulse: 81   Temp: 36.4 °C (97.6 °F)   SpO2: 93%       Review of Systems    Objective   Physical Exam  Vitals and nursing note reviewed.   Constitutional:       Appearance: Normal appearance. She is not ill-appearing.   Neurological:      Mental Status: She is alert.         Assessment/Plan   Problem List Items Addressed This Visit       ADD (attention deficit disorder)    Relevant Medications    lisdexamfetamine (Vyvanse) 70 mg capsule    ADHD (attention deficit hyperactivity disorder), combined type - Primary     Other Visit Diagnoses       PCOS (polycystic ovarian syndrome)        Relevant Medications    lisdexamfetamine (Vyvanse) 70 mg capsule                 Thank you for coming in today, please call my office if you have any concerns or questions.     Mikael GIANG, CNP

## 2024-01-26 NOTE — PATIENT INSTRUCTIONS
Continue stimulant therapy   See in 3 months for follow up    Thank you for coming in today, if any questions or concerns arise, please call my office.   Mikael Davies, RAHAT-CNP

## 2024-03-01 DIAGNOSIS — F90.8 ATTENTION DEFICIT HYPERACTIVITY DISORDER (ADHD), OTHER TYPE: ICD-10-CM

## 2024-03-01 DIAGNOSIS — E28.2 PCOS (POLYCYSTIC OVARIAN SYNDROME): ICD-10-CM

## 2024-03-01 RX ORDER — LISDEXAMFETAMINE DIMESYLATE 70 MG/1
70 CAPSULE ORAL
Qty: 30 CAPSULE | Refills: 0 | Status: SHIPPED | OUTPATIENT
Start: 2024-03-01 | End: 2024-03-05 | Stop reason: SDUPTHER

## 2024-03-05 DIAGNOSIS — F90.8 ATTENTION DEFICIT HYPERACTIVITY DISORDER (ADHD), OTHER TYPE: ICD-10-CM

## 2024-03-05 DIAGNOSIS — E28.2 PCOS (POLYCYSTIC OVARIAN SYNDROME): ICD-10-CM

## 2024-03-05 RX ORDER — LISDEXAMFETAMINE DIMESYLATE 70 MG/1
70 CAPSULE ORAL
Qty: 30 CAPSULE | Refills: 0 | Status: SHIPPED | OUTPATIENT
Start: 2024-03-05 | End: 2024-04-04 | Stop reason: SDUPTHER

## 2024-03-13 DIAGNOSIS — E28.2 PCOS (POLYCYSTIC OVARIAN SYNDROME): ICD-10-CM

## 2024-03-13 RX ORDER — SITAGLIPTIN 100 MG/1
100 TABLET, FILM COATED ORAL DAILY
Qty: 30 TABLET | Refills: 2 | Status: SHIPPED | OUTPATIENT
Start: 2024-03-13

## 2024-04-04 DIAGNOSIS — E28.2 PCOS (POLYCYSTIC OVARIAN SYNDROME): ICD-10-CM

## 2024-04-04 DIAGNOSIS — F90.8 ATTENTION DEFICIT HYPERACTIVITY DISORDER (ADHD), OTHER TYPE: ICD-10-CM

## 2024-04-04 RX ORDER — LISDEXAMFETAMINE DIMESYLATE 70 MG/1
70 CAPSULE ORAL
Qty: 30 CAPSULE | Refills: 0 | Status: SHIPPED | OUTPATIENT
Start: 2024-04-04 | End: 2024-04-26 | Stop reason: SDUPTHER

## 2024-04-26 ENCOUNTER — OFFICE VISIT (OUTPATIENT)
Dept: PRIMARY CARE | Facility: CLINIC | Age: 49
End: 2024-04-26
Payer: COMMERCIAL

## 2024-04-26 VITALS
BODY MASS INDEX: 42.91 KG/M2 | OXYGEN SATURATION: 97 % | TEMPERATURE: 98 F | WEIGHT: 250 LBS | SYSTOLIC BLOOD PRESSURE: 140 MMHG | DIASTOLIC BLOOD PRESSURE: 80 MMHG | HEART RATE: 134 BPM

## 2024-04-26 DIAGNOSIS — F90.8 ATTENTION DEFICIT HYPERACTIVITY DISORDER (ADHD), OTHER TYPE: ICD-10-CM

## 2024-04-26 DIAGNOSIS — E28.2 PCOS (POLYCYSTIC OVARIAN SYNDROME): ICD-10-CM

## 2024-04-26 DIAGNOSIS — J01.00 ACUTE NON-RECURRENT MAXILLARY SINUSITIS: ICD-10-CM

## 2024-04-26 DIAGNOSIS — F90.2 ADHD (ATTENTION DEFICIT HYPERACTIVITY DISORDER), COMBINED TYPE: Primary | ICD-10-CM

## 2024-04-26 PROCEDURE — 99214 OFFICE O/P EST MOD 30 MIN: CPT

## 2024-04-26 PROCEDURE — 3008F BODY MASS INDEX DOCD: CPT

## 2024-04-26 RX ORDER — LISDEXAMFETAMINE DIMESYLATE 70 MG/1
70 CAPSULE ORAL
Qty: 30 EACH | Refills: 0 | Status: SHIPPED | OUTPATIENT
Start: 2024-04-26 | End: 2024-06-03 | Stop reason: SDUPTHER

## 2024-04-26 RX ORDER — CEPHALEXIN 500 MG/1
500 CAPSULE ORAL 2 TIMES DAILY
Qty: 14 CAPSULE | Refills: 0 | Status: SHIPPED | OUTPATIENT
Start: 2024-04-26 | End: 2024-05-03

## 2024-04-26 RX ORDER — PREDNISONE 10 MG/1
10 TABLET ORAL 3 TIMES DAILY
Qty: 9 TABLET | Refills: 0 | Status: SHIPPED | OUTPATIENT
Start: 2024-04-26 | End: 2024-04-29

## 2024-04-26 RX ORDER — PROMETHAZINE HYDROCHLORIDE AND DEXTROMETHORPHAN HYDROBROMIDE 6.25; 15 MG/5ML; MG/5ML
5 SYRUP ORAL 4 TIMES DAILY PRN
Qty: 240 ML | Refills: 1 | Status: SHIPPED | OUTPATIENT
Start: 2024-04-26 | End: 2024-05-03

## 2024-04-26 NOTE — PROGRESS NOTES
Subjective   Patient ID: Fatou Schilling is a 48 y.o. female who presents for Follow-up (Fatou is here for her 90 day controlled. ).  Stimulants:   What is the patient's goal of therapy? ADHD treatment  Is this being achieved with current treatment? yes    Activities of Daily Living:   Is your overall impression that this patient is benefiting (symptom reduction outweighs side effects) from stimulant therapy? Yes     1. Physical Functioning: Better  2. Family Relationship: Better  3. Social Relationship: Better  4. Mood: Better  5. Sleep Patterns: Better  6. Overall Function: Better    Subjective  Fatou Schilling is a 48 y.o. female who presents for evaluation of sinus pain. Symptoms include: congestion, cough, and facial pain. Onset of symptoms was a few weeks ago. Symptoms have been gradually worsening since that time. Past history is significant for asthma. Patient is a non-smoker.    Objective  [unfilled]     Assessment/Plan  Acute bacterial sinusitis.    Azithromycin and pred burst per medication orders.                Vitals:    04/26/24 1441   BP: 140/80   Pulse: (!) 134   Temp: 36.7 °C (98 °F)   SpO2: 97%       Review of Systems    Objective   Physical Exam    Assessment/Plan   Problem List Items Addressed This Visit       Acute maxillary sinusitis    Relevant Medications    cephalexin (Keflex) 500 mg capsule    promethazine-DM (Phenergan-DM) 6.25-15 mg/5 mL syrup    predniSONE (Deltasone) 10 mg tablet    artifi.tears,hypromellose,,PF, 0.3 % drops    ADD (attention deficit disorder)    Relevant Medications    lisdexamfetamine (Vyvanse) 70 mg capsule    ADHD (attention deficit hyperactivity disorder), combined type - Primary     Other Visit Diagnoses       PCOS (polycystic ovarian syndrome)        Relevant Medications    lisdexamfetamine (Vyvanse) 70 mg capsule                 Thank you for coming in today, please call my office if you have any concerns or questions.     Mikael GIANG, CNP

## 2024-06-03 DIAGNOSIS — E28.2 PCOS (POLYCYSTIC OVARIAN SYNDROME): ICD-10-CM

## 2024-06-03 DIAGNOSIS — F90.8 ATTENTION DEFICIT HYPERACTIVITY DISORDER (ADHD), OTHER TYPE: ICD-10-CM

## 2024-06-03 RX ORDER — LISDEXAMFETAMINE DIMESYLATE 70 MG/1
70 CAPSULE ORAL
Qty: 30 CAPSULE | Refills: 0 | Status: SHIPPED | OUTPATIENT
Start: 2024-06-03

## 2024-07-04 DIAGNOSIS — E28.2 PCOS (POLYCYSTIC OVARIAN SYNDROME): ICD-10-CM

## 2024-07-05 ENCOUNTER — PATIENT MESSAGE (OUTPATIENT)
Dept: PRIMARY CARE | Facility: CLINIC | Age: 49
End: 2024-07-05
Payer: COMMERCIAL

## 2024-07-05 DIAGNOSIS — F90.8 ATTENTION DEFICIT HYPERACTIVITY DISORDER (ADHD), OTHER TYPE: ICD-10-CM

## 2024-07-05 DIAGNOSIS — E28.2 PCOS (POLYCYSTIC OVARIAN SYNDROME): ICD-10-CM

## 2024-07-05 RX ORDER — SITAGLIPTIN 100 MG/1
100 TABLET, FILM COATED ORAL DAILY
Qty: 30 TABLET | Refills: 2 | Status: SHIPPED | OUTPATIENT
Start: 2024-07-05 | End: 2024-07-05 | Stop reason: SDUPTHER

## 2024-07-08 RX ORDER — LISDEXAMFETAMINE DIMESYLATE 70 MG/1
70 CAPSULE ORAL
Qty: 30 CAPSULE | Refills: 0 | Status: SHIPPED | OUTPATIENT
Start: 2024-07-08 | End: 2024-07-12 | Stop reason: SDUPTHER

## 2024-07-12 RX ORDER — LISDEXAMFETAMINE DIMESYLATE 70 MG/1
70 CAPSULE ORAL
Qty: 30 CAPSULE | Refills: 0 | Status: SHIPPED | OUTPATIENT
Start: 2024-07-12

## 2024-07-26 ENCOUNTER — APPOINTMENT (OUTPATIENT)
Dept: PRIMARY CARE | Facility: CLINIC | Age: 49
End: 2024-07-26
Payer: COMMERCIAL

## 2024-08-09 ENCOUNTER — APPOINTMENT (OUTPATIENT)
Dept: PRIMARY CARE | Facility: CLINIC | Age: 49
End: 2024-08-09
Payer: COMMERCIAL

## 2024-08-09 VITALS — HEART RATE: 114 BPM | SYSTOLIC BLOOD PRESSURE: 140 MMHG | OXYGEN SATURATION: 97 % | DIASTOLIC BLOOD PRESSURE: 80 MMHG

## 2024-08-09 DIAGNOSIS — M48.02 STENOSIS OF CERVICAL SPINE WITH MYELOPATHY (MULTI): ICD-10-CM

## 2024-08-09 DIAGNOSIS — G99.2 STENOSIS OF CERVICAL SPINE WITH MYELOPATHY (MULTI): ICD-10-CM

## 2024-08-09 DIAGNOSIS — R60.1 GENERALIZED EDEMA: ICD-10-CM

## 2024-08-09 DIAGNOSIS — E78.5 DYSLIPIDEMIA: ICD-10-CM

## 2024-08-09 DIAGNOSIS — E28.2 PCOS (POLYCYSTIC OVARIAN SYNDROME): ICD-10-CM

## 2024-08-09 DIAGNOSIS — E04.1 THYROID NODULE: Primary | ICD-10-CM

## 2024-08-09 DIAGNOSIS — R20.9 DISTURBANCE OF SKIN SENSATION: ICD-10-CM

## 2024-08-09 DIAGNOSIS — M47.817 LUMBAR AND SACRAL SPONDYLOARTHRITIS: ICD-10-CM

## 2024-08-09 DIAGNOSIS — F90.8 ATTENTION DEFICIT HYPERACTIVITY DISORDER (ADHD), OTHER TYPE: ICD-10-CM

## 2024-08-09 PROCEDURE — 99214 OFFICE O/P EST MOD 30 MIN: CPT

## 2024-08-09 RX ORDER — TRIAMCINOLONE ACETONIDE 1 MG/G
CREAM TOPICAL 2 TIMES DAILY
Qty: 454 G | Refills: 0 | Status: SHIPPED | OUTPATIENT
Start: 2024-08-09

## 2024-08-09 RX ORDER — FUROSEMIDE 80 MG/1
80 TABLET ORAL DAILY
Qty: 30 TABLET | Refills: 0 | Status: SHIPPED | OUTPATIENT
Start: 2024-08-09 | End: 2024-09-08

## 2024-08-09 RX ORDER — LISDEXAMFETAMINE DIMESYLATE 70 MG/1
70 CAPSULE ORAL
Qty: 30 CAPSULE | Refills: 0 | Status: SHIPPED | OUTPATIENT
Start: 2024-08-09

## 2024-08-09 NOTE — PROGRESS NOTES
Subjective   Patient ID: Fatou Schilling is a 48 y.o. female who presents for Follow-up (Fatou is here for her 90 day controlled. [facility] [place] [facility]. ).  Stimulants:   What is the patient's goal of therapy? ADHD  Is this being achieved with current treatment? YES    Activities of Daily Living:   Is your overall impression that this patient is benefiting (symptom reduction outweighs side effects) from stimulant therapy? Yes     1. Physical Functioning: Better  2. Family Relationship: Better  3. Social Relationship: Better  4. Mood: Better  5. Sleep Patterns: Better  6. Overall Function: Better                Vitals:    08/09/24 1444   BP: 140/80   Pulse: (!) 114   SpO2: 97%       Review of Systems    Objective   Physical Exam  Vitals and nursing note reviewed.   Constitutional:       Appearance: Normal appearance.   Cardiovascular:      Pulses:           Carotid pulses are 2+ on the right side and 2+ on the left side.  Neurological:      Mental Status: She is alert.         Assessment/Plan   Problem List Items Addressed This Visit       ADD (attention deficit disorder)    Relevant Medications    lisdexamfetamine (Vyvanse) 70 mg capsule    Disturbance of skin sensation    Relevant Medications    triamcinolone (Kenalog) 0.1 % cream    Dyslipidemia    Edema    Relevant Medications    furosemide (Lasix) 80 mg tablet    Lumbar and sacral spondyloarthritis    Relevant Orders    Referral to Orthopaedic Surgery    Stenosis of cervical spine with myelopathy (Multi)    Relevant Orders    Referral to Orthopaedic Surgery     Other Visit Diagnoses       Thyroid nodule    -  Primary    Relevant Orders    US thyroid    PCOS (polycystic ovarian syndrome)        Relevant Medications    lisdexamfetamine (Vyvanse) 70 mg capsule                 Thank you for coming in today, please call my office if you have any concerns or questions.     Mikael GIANG, CNP

## 2024-08-09 NOTE — PATIENT INSTRUCTIONS
SEE DR [facility] SPECIALIST  LASIX IS REFILLED   TAKE 1/2 TAB FOR 7 DAYS THEN as needed    SEE 90 DAYS FOLLOWING UP    Thank you for coming in today, if any questions or concerns arise, please call my office.   Mikael Davies, RAHAT-CNP

## 2024-09-06 DIAGNOSIS — E28.2 PCOS (POLYCYSTIC OVARIAN SYNDROME): ICD-10-CM

## 2024-09-06 DIAGNOSIS — F90.8 ATTENTION DEFICIT HYPERACTIVITY DISORDER (ADHD), OTHER TYPE: ICD-10-CM

## 2024-09-09 RX ORDER — LISDEXAMFETAMINE DIMESYLATE 70 MG/1
70 CAPSULE ORAL
Qty: 30 CAPSULE | Refills: 0 | Status: SHIPPED | OUTPATIENT
Start: 2024-09-09

## 2024-10-09 DIAGNOSIS — E28.2 PCOS (POLYCYSTIC OVARIAN SYNDROME): ICD-10-CM

## 2024-10-10 RX ORDER — LISDEXAMFETAMINE DIMESYLATE 70 MG/1
70 CAPSULE ORAL
Qty: 30 CAPSULE | Refills: 0 | Status: SHIPPED | OUTPATIENT
Start: 2024-10-10

## 2024-10-31 ENCOUNTER — APPOINTMENT (OUTPATIENT)
Dept: ORTHOPEDIC SURGERY | Facility: CLINIC | Age: 49
End: 2024-10-31
Payer: COMMERCIAL

## 2024-11-05 ENCOUNTER — APPOINTMENT (OUTPATIENT)
Dept: PRIMARY CARE | Facility: CLINIC | Age: 49
End: 2024-11-05
Payer: COMMERCIAL

## 2024-11-05 VITALS
DIASTOLIC BLOOD PRESSURE: 90 MMHG | SYSTOLIC BLOOD PRESSURE: 130 MMHG | TEMPERATURE: 97.2 F | OXYGEN SATURATION: 98 % | HEART RATE: 119 BPM | BODY MASS INDEX: 42.09 KG/M2 | WEIGHT: 245.2 LBS

## 2024-11-05 DIAGNOSIS — Z79.899 MEDICATION MANAGEMENT: ICD-10-CM

## 2024-11-05 DIAGNOSIS — N89.8 VAGINAL DISCHARGE: ICD-10-CM

## 2024-11-05 LAB
AMPHETAMINES UR QL SCN: ABNORMAL
BARBITURATES UR QL SCN: ABNORMAL
BENZODIAZ UR QL SCN: ABNORMAL
BZE UR QL SCN: ABNORMAL
CANNABINOIDS UR QL SCN: ABNORMAL
FENTANYL+NORFENTANYL UR QL SCN: ABNORMAL
METHADONE UR QL SCN: ABNORMAL
OPIATES UR QL SCN: ABNORMAL
OXYCODONE+OXYMORPHONE UR QL SCN: ABNORMAL
PCP UR QL SCN: ABNORMAL

## 2024-11-05 PROCEDURE — 87205 SMEAR GRAM STAIN: CPT

## 2024-11-05 PROCEDURE — 80307 DRUG TEST PRSMV CHEM ANLYZR: CPT

## 2024-11-05 PROCEDURE — 99214 OFFICE O/P EST MOD 30 MIN: CPT

## 2024-11-05 NOTE — PATIENT INSTRUCTIONS
See ortho for MRI referral  Continue Vyvanse    BV panel today  Take probiotic after we treat this    Ears are red, take flonase for allergies   Claritin 10mg daily.    Thank you for coming in today, if any questions or concerns arise, please call my office.   Mikael Davies, RAHAT-CNP

## 2024-11-05 NOTE — PROGRESS NOTES
Subjective   Patient ID: Fatou Schilling is a 48 y.o. female who presents for Follow-up (90 day controlled), Med Refill, and Vaginal Discharge (Concerns for BV- had her swab. ).  Stimulants:   What is the patient's goal of therapy? vyvanse  Is this being achieved with current treatment? yes    Activities of Daily Living:   Is your overall impression that this patient is benefiting (symptom reduction outweighs side effects) from stimulant therapy? Yes     1. Physical Functioning: Better  2. Family Relationship: Better  3. Social Relationship: Better  4. Mood: Better  5. Sleep Patterns: Better  6. Overall Function: Better    Sleep is fair, baseline.    BV Concern  --swab obtained by MA  --monitor    Continued back pain  --see ortho  --needs MRI, recommend she see ortho for orders for this.          Vitals:    11/05/24 1545   BP: 130/90   Pulse: (!) 119   Temp: 36.2 °C (97.2 °F)   SpO2: 98%       Review of Systems    Objective   Physical Exam    Assessment/Plan   Problem List Items Addressed This Visit    None  Visit Diagnoses       Medication management        Relevant Orders    Drug Screen, Urine With Reflex to Confirmation (Completed)    Amphetamine Confirm, Urine    Vaginal discharge        Relevant Orders    Vaginitis Gram Stain For Bacterial Vaginosis + Yeast                 Thank you for coming in today, please call my office if you have any concerns or questions.     Mikael GIANG, CNP

## 2024-11-07 LAB
CLUE CELLS VAG LPF-#/AREA: NORMAL /[LPF]
NUGENT SCORE: 0
YEAST VAG WET PREP-#/AREA: NORMAL

## 2024-11-09 LAB
AMPHET UR-MCNC: >5000 NG/ML
MDA UR-MCNC: <200 NG/ML
MDEA UR-MCNC: <200 NG/ML
MDMA UR-MCNC: <200 NG/ML
METHAMPHET UR-MCNC: <200 NG/ML
PHENTERMINE UR CFM-MCNC: <200 NG/ML

## 2024-11-11 ENCOUNTER — TELEPHONE (OUTPATIENT)
Dept: PRIMARY CARE | Facility: CLINIC | Age: 49
End: 2024-11-11
Payer: COMMERCIAL

## 2024-11-11 DIAGNOSIS — L30.9 DERMATITIS: Primary | ICD-10-CM

## 2024-11-11 DIAGNOSIS — E28.2 PCOS (POLYCYSTIC OVARIAN SYNDROME): ICD-10-CM

## 2024-11-11 RX ORDER — LISDEXAMFETAMINE DIMESYLATE 70 MG/1
70 CAPSULE ORAL
Qty: 30 CAPSULE | Refills: 0 | Status: SHIPPED | OUTPATIENT
Start: 2024-11-11

## 2024-11-11 RX ORDER — CLINDAMYCIN HYDROCHLORIDE 300 MG/1
300 CAPSULE ORAL 3 TIMES DAILY
Qty: 30 CAPSULE | Refills: 0 | Status: SHIPPED | OUTPATIENT
Start: 2024-11-11 | End: 2024-11-21

## 2024-11-11 NOTE — TELEPHONE ENCOUNTER
Fatou stopped by the office. She is asking if you are able to send a refill for her vyvanse and something for the sores she has under her breasts that she talked about at her appointment last week.

## 2024-12-09 DIAGNOSIS — E28.2 PCOS (POLYCYSTIC OVARIAN SYNDROME): ICD-10-CM

## 2024-12-10 RX ORDER — LISDEXAMFETAMINE DIMESYLATE 70 MG/1
70 CAPSULE ORAL
Qty: 30 CAPSULE | Refills: 0 | Status: SHIPPED | OUTPATIENT
Start: 2024-12-10

## 2025-01-08 DIAGNOSIS — E28.2 PCOS (POLYCYSTIC OVARIAN SYNDROME): ICD-10-CM

## 2025-01-09 RX ORDER — LISDEXAMFETAMINE DIMESYLATE 70 MG/1
70 CAPSULE ORAL
Qty: 30 CAPSULE | Refills: 0 | Status: SHIPPED | OUTPATIENT
Start: 2025-01-09

## 2025-02-06 DIAGNOSIS — E28.2 PCOS (POLYCYSTIC OVARIAN SYNDROME): ICD-10-CM

## 2025-02-06 RX ORDER — LISDEXAMFETAMINE DIMESYLATE 70 MG/1
70 CAPSULE ORAL
Qty: 30 CAPSULE | Refills: 0 | Status: SHIPPED | OUTPATIENT
Start: 2025-02-06

## 2025-02-06 RX ORDER — SITAGLIPTIN 100 MG/1
100 TABLET, FILM COATED ORAL DAILY
Qty: 30 TABLET | OUTPATIENT
Start: 2025-02-06

## 2025-03-06 DIAGNOSIS — E28.2 PCOS (POLYCYSTIC OVARIAN SYNDROME): ICD-10-CM

## 2025-03-07 RX ORDER — LISDEXAMFETAMINE DIMESYLATE 70 MG/1
70 CAPSULE ORAL
Qty: 30 CAPSULE | Refills: 0 | Status: SHIPPED | OUTPATIENT
Start: 2025-03-07

## 2025-04-08 DIAGNOSIS — E28.2 PCOS (POLYCYSTIC OVARIAN SYNDROME): ICD-10-CM

## 2025-04-08 RX ORDER — LISDEXAMFETAMINE DIMESYLATE 70 MG/1
70 CAPSULE ORAL
Qty: 30 CAPSULE | Refills: 0 | Status: SHIPPED | OUTPATIENT
Start: 2025-04-08

## 2025-04-29 ENCOUNTER — APPOINTMENT (OUTPATIENT)
Dept: PRIMARY CARE | Facility: CLINIC | Age: 50
End: 2025-04-29
Payer: COMMERCIAL

## 2025-04-29 DIAGNOSIS — L30.9 DERMATITIS: ICD-10-CM

## 2025-04-29 DIAGNOSIS — E28.2 PCOS (POLYCYSTIC OVARIAN SYNDROME): ICD-10-CM

## 2025-04-29 DIAGNOSIS — F90.2 ADHD (ATTENTION DEFICIT HYPERACTIVITY DISORDER), COMBINED TYPE: Primary | ICD-10-CM

## 2025-04-29 PROCEDURE — 99213 OFFICE O/P EST LOW 20 MIN: CPT

## 2025-04-29 RX ORDER — LISDEXAMFETAMINE DIMESYLATE 70 MG/1
70 CAPSULE ORAL
Qty: 30 CAPSULE | Refills: 0 | Status: SHIPPED | OUTPATIENT
Start: 2025-05-06 | End: 2025-06-05

## 2025-04-29 RX ORDER — DOXYCYCLINE 100 MG/1
100 CAPSULE ORAL 2 TIMES DAILY
Qty: 28 CAPSULE | Refills: 0 | Status: SHIPPED | OUTPATIENT
Start: 2025-04-29 | End: 2025-05-13

## 2025-04-29 NOTE — PROGRESS NOTES
Subjective   Patient ID: Fatou Schilling is a 49 y.o. female who presents for Follow-up (6 month), Med Management (Contract and UDS UTD), and Rash (Antibiotics did not work. She is asking if she can try doxycycline).    Stimulants:   What is the patient's goal of therapy? Vyvanse  Is this being achieved with current treatment? yes    Activities of Daily Living:   Is your overall impression that this patient is benefiting (symptom reduction outweighs side effects) from stimulant therapy? Yes     1. Physical Functioning: Better  2. Family Relationship: Better  3. Social Relationship: Better  4. Mood: Better  5. Sleep Patterns: Better  6. Overall Function: Better    Ongoing rash not improving with previous antibiotic  Start doxy, derm referral placed              Rash        There were no vitals filed for this visit.    Review of Systems   Skin:  Positive for rash.       Objective   Physical Exam    Assessment/Plan   Problem List Items Addressed This Visit       ADHD (attention deficit hyperactivity disorder), combined type - Primary    Dermatitis    Relevant Medications    doxycycline (Vibramycin) 100 mg capsule    Other Relevant Orders    Referral to Dermatology    PCOS (polycystic ovarian syndrome)    Relevant Medications    SITagliptin phosphate (Januvia) 100 mg tablet    lisdexamfetamine (Vyvanse) 70 mg capsule (Start on 5/6/2025)            Thank you for coming in today, please call my office if you have any concerns or questions.     Mikael GIANG, CNP

## 2025-06-06 DIAGNOSIS — E28.2 PCOS (POLYCYSTIC OVARIAN SYNDROME): ICD-10-CM

## 2025-06-06 RX ORDER — LISDEXAMFETAMINE DIMESYLATE 70 MG/1
70 CAPSULE ORAL
Qty: 30 CAPSULE | Refills: 0 | Status: SHIPPED | OUTPATIENT
Start: 2025-06-06 | End: 2025-07-06

## 2025-06-10 DIAGNOSIS — Z12.31 ENCOUNTER FOR SCREENING MAMMOGRAM FOR BREAST CANCER: ICD-10-CM

## 2025-07-01 DIAGNOSIS — E28.2 PCOS (POLYCYSTIC OVARIAN SYNDROME): ICD-10-CM

## 2025-07-02 RX ORDER — LISDEXAMFETAMINE DIMESYLATE 70 MG/1
70 CAPSULE ORAL
Qty: 30 CAPSULE | Refills: 0 | Status: SHIPPED | OUTPATIENT
Start: 2025-07-02 | End: 2025-08-01

## 2025-07-31 DIAGNOSIS — E28.2 PCOS (POLYCYSTIC OVARIAN SYNDROME): ICD-10-CM

## 2025-07-31 RX ORDER — LISDEXAMFETAMINE DIMESYLATE 70 MG/1
70 CAPSULE ORAL
Qty: 30 CAPSULE | Refills: 0 | Status: SHIPPED | OUTPATIENT
Start: 2025-07-31 | End: 2025-08-30

## 2025-09-03 DIAGNOSIS — E28.2 PCOS (POLYCYSTIC OVARIAN SYNDROME): ICD-10-CM

## 2025-09-04 RX ORDER — LISDEXAMFETAMINE DIMESYLATE 70 MG/1
70 CAPSULE ORAL
Qty: 30 CAPSULE | Refills: 0 | Status: SHIPPED | OUTPATIENT
Start: 2025-09-04 | End: 2025-10-04

## 2025-09-18 ENCOUNTER — APPOINTMENT (OUTPATIENT)
Dept: DERMATOLOGY | Facility: CLINIC | Age: 50
End: 2025-09-18
Payer: COMMERCIAL